# Patient Record
Sex: MALE | Race: WHITE | NOT HISPANIC OR LATINO | Employment: OTHER | URBAN - METROPOLITAN AREA
[De-identification: names, ages, dates, MRNs, and addresses within clinical notes are randomized per-mention and may not be internally consistent; named-entity substitution may affect disease eponyms.]

---

## 2017-01-20 ENCOUNTER — HOSPITAL ENCOUNTER (OUTPATIENT)
Facility: MEDICAL CENTER | Age: 72
End: 2017-01-21
Attending: EMERGENCY MEDICINE | Admitting: INTERNAL MEDICINE
Payer: COMMERCIAL

## 2017-01-20 ENCOUNTER — APPOINTMENT (OUTPATIENT)
Dept: RADIOLOGY | Facility: MEDICAL CENTER | Age: 72
End: 2017-01-20
Attending: EMERGENCY MEDICINE
Payer: COMMERCIAL

## 2017-01-20 ENCOUNTER — RESOLUTE PROFESSIONAL BILLING HOSPITAL PROF FEE (OUTPATIENT)
Dept: HOSPITALIST | Facility: MEDICAL CENTER | Age: 72
End: 2017-01-20
Payer: COMMERCIAL

## 2017-01-20 ENCOUNTER — APPOINTMENT (OUTPATIENT)
Dept: RADIOLOGY | Facility: MEDICAL CENTER | Age: 72
End: 2017-01-20
Attending: INTERNAL MEDICINE
Payer: COMMERCIAL

## 2017-01-20 DIAGNOSIS — G45.8 OTHER SPECIFIED TRANSIENT CEREBRAL ISCHEMIAS: ICD-10-CM

## 2017-01-20 DIAGNOSIS — R41.0 TRANSIENT CONFUSION: ICD-10-CM

## 2017-01-20 PROBLEM — G45.9 TIA (TRANSIENT ISCHEMIC ATTACK): Status: ACTIVE | Noted: 2017-01-20

## 2017-01-20 LAB
ALBUMIN SERPL BCP-MCNC: 4.1 G/DL (ref 3.2–4.9)
ALBUMIN/GLOB SERPL: 1.6 G/DL
ALP SERPL-CCNC: 53 U/L (ref 30–99)
ALT SERPL-CCNC: 11 U/L (ref 2–50)
ANION GAP SERPL CALC-SCNC: 5 MMOL/L (ref 0–11.9)
APPEARANCE UR: CLEAR
APTT PPP: 33 SEC (ref 24.7–36)
AST SERPL-CCNC: 14 U/L (ref 12–45)
BASOPHILS # BLD AUTO: 0.4 % (ref 0–1.8)
BASOPHILS # BLD: 0.02 K/UL (ref 0–0.12)
BILIRUB SERPL-MCNC: 0.5 MG/DL (ref 0.1–1.5)
BILIRUB UR QL STRIP.AUTO: NEGATIVE
BUN SERPL-MCNC: 19 MG/DL (ref 8–22)
CALCIUM SERPL-MCNC: 8.9 MG/DL (ref 8.5–10.5)
CHLORIDE SERPL-SCNC: 108 MMOL/L (ref 96–112)
CO2 SERPL-SCNC: 27 MMOL/L (ref 20–33)
COLOR UR: YELLOW
CREAT SERPL-MCNC: 0.85 MG/DL (ref 0.5–1.4)
CULTURE IF INDICATED INDCX: NO UA CULTURE
EOSINOPHIL # BLD AUTO: 0.13 K/UL (ref 0–0.51)
EOSINOPHIL NFR BLD: 2.6 % (ref 0–6.9)
ERYTHROCYTE [DISTWIDTH] IN BLOOD BY AUTOMATED COUNT: 45.1 FL (ref 35.9–50)
EST. AVERAGE GLUCOSE BLD GHB EST-MCNC: 117 MG/DL
GFR SERPL CREATININE-BSD FRML MDRD: >60 ML/MIN/1.73 M 2
GLOBULIN SER CALC-MCNC: 2.5 G/DL (ref 1.9–3.5)
GLUCOSE SERPL-MCNC: 117 MG/DL (ref 65–99)
GLUCOSE UR STRIP.AUTO-MCNC: NEGATIVE MG/DL
HBA1C MFR BLD: 5.7 % (ref 0–5.6)
HCT VFR BLD AUTO: 38 % (ref 42–52)
HGB BLD-MCNC: 13.3 G/DL (ref 14–18)
IMM GRANULOCYTES # BLD AUTO: 0 K/UL (ref 0–0.11)
IMM GRANULOCYTES NFR BLD AUTO: 0 % (ref 0–0.9)
INR PPP: 2.1 (ref 0.87–1.13)
KETONES UR STRIP.AUTO-MCNC: NEGATIVE MG/DL
LEUKOCYTE ESTERASE UR QL STRIP.AUTO: NEGATIVE
LYMPHOCYTES # BLD AUTO: 1.63 K/UL (ref 1–4.8)
LYMPHOCYTES NFR BLD: 32.5 % (ref 22–41)
MCH RBC QN AUTO: 33.3 PG (ref 27–33)
MCHC RBC AUTO-ENTMCNC: 35 G/DL (ref 33.7–35.3)
MCV RBC AUTO: 95 FL (ref 81.4–97.8)
MICRO URNS: NORMAL
MONOCYTES # BLD AUTO: 0.49 K/UL (ref 0–0.85)
MONOCYTES NFR BLD AUTO: 9.8 % (ref 0–13.4)
NEUTROPHILS # BLD AUTO: 2.75 K/UL (ref 1.82–7.42)
NEUTROPHILS NFR BLD: 54.7 % (ref 44–72)
NITRITE UR QL STRIP.AUTO: NEGATIVE
NRBC # BLD AUTO: 0 K/UL
NRBC BLD AUTO-RTO: 0 /100 WBC
PH UR STRIP.AUTO: 5.5 [PH]
PLATELET # BLD AUTO: 180 K/UL (ref 164–446)
PMV BLD AUTO: 9.7 FL (ref 9–12.9)
POTASSIUM SERPL-SCNC: 3.7 MMOL/L (ref 3.6–5.5)
PROT SERPL-MCNC: 6.6 G/DL (ref 6–8.2)
PROT UR QL STRIP: NEGATIVE MG/DL
PROTHROMBIN TIME: 24.2 SEC (ref 12–14.6)
RBC # BLD AUTO: 4 M/UL (ref 4.7–6.1)
RBC UR QL AUTO: NEGATIVE
SODIUM SERPL-SCNC: 140 MMOL/L (ref 135–145)
SP GR UR STRIP.AUTO: 1.02
TROPONIN I SERPL-MCNC: <0.01 NG/ML (ref 0–0.04)
WBC # BLD AUTO: 5 K/UL (ref 4.8–10.8)

## 2017-01-20 PROCEDURE — 71010 DX-CHEST-PORTABLE (1 VIEW): CPT

## 2017-01-20 PROCEDURE — 700102 HCHG RX REV CODE 250 W/ 637 OVERRIDE(OP): Performed by: INTERNAL MEDICINE

## 2017-01-20 PROCEDURE — G0378 HOSPITAL OBSERVATION PER HR: HCPCS

## 2017-01-20 PROCEDURE — 85025 COMPLETE CBC W/AUTO DIFF WBC: CPT

## 2017-01-20 PROCEDURE — 85610 PROTHROMBIN TIME: CPT

## 2017-01-20 PROCEDURE — 70450 CT HEAD/BRAIN W/O DYE: CPT

## 2017-01-20 PROCEDURE — 700105 HCHG RX REV CODE 258: Performed by: EMERGENCY MEDICINE

## 2017-01-20 PROCEDURE — 83036 HEMOGLOBIN GLYCOSYLATED A1C: CPT

## 2017-01-20 PROCEDURE — 99220 PR INITIAL OBSERVATION CARE,LEVL III: CPT | Performed by: INTERNAL MEDICINE

## 2017-01-20 PROCEDURE — 93005 ELECTROCARDIOGRAM TRACING: CPT | Performed by: EMERGENCY MEDICINE

## 2017-01-20 PROCEDURE — 99285 EMERGENCY DEPT VISIT HI MDM: CPT

## 2017-01-20 PROCEDURE — 85730 THROMBOPLASTIN TIME PARTIAL: CPT

## 2017-01-20 PROCEDURE — 80053 COMPREHEN METABOLIC PANEL: CPT

## 2017-01-20 PROCEDURE — 70551 MRI BRAIN STEM W/O DYE: CPT

## 2017-01-20 PROCEDURE — 81003 URINALYSIS AUTO W/O SCOPE: CPT

## 2017-01-20 PROCEDURE — 96360 HYDRATION IV INFUSION INIT: CPT

## 2017-01-20 PROCEDURE — A9270 NON-COVERED ITEM OR SERVICE: HCPCS | Performed by: INTERNAL MEDICINE

## 2017-01-20 PROCEDURE — 84484 ASSAY OF TROPONIN QUANT: CPT

## 2017-01-20 RX ORDER — ENEMA 19; 7 G/133ML; G/133ML
1 ENEMA RECTAL
Status: DISCONTINUED | OUTPATIENT
Start: 2017-01-20 | End: 2017-01-21 | Stop reason: HOSPADM

## 2017-01-20 RX ORDER — SODIUM CHLORIDE 9 MG/ML
1000 INJECTION, SOLUTION INTRAVENOUS ONCE
Status: COMPLETED | OUTPATIENT
Start: 2017-01-20 | End: 2017-01-20

## 2017-01-20 RX ORDER — AMOXICILLIN 250 MG
1 CAPSULE ORAL NIGHTLY
Status: DISCONTINUED | OUTPATIENT
Start: 2017-01-21 | End: 2017-01-21 | Stop reason: HOSPADM

## 2017-01-20 RX ORDER — BISACODYL 10 MG
10 SUPPOSITORY, RECTAL RECTAL
Status: DISCONTINUED | OUTPATIENT
Start: 2017-01-20 | End: 2017-01-21 | Stop reason: HOSPADM

## 2017-01-20 RX ORDER — CHLORAL HYDRATE 500 MG
1000 CAPSULE ORAL EVERY EVENING
COMMUNITY

## 2017-01-20 RX ORDER — LOVASTATIN 20 MG/1
80 TABLET ORAL NIGHTLY
Status: DISCONTINUED | OUTPATIENT
Start: 2017-01-20 | End: 2017-01-21 | Stop reason: HOSPADM

## 2017-01-20 RX ORDER — WARFARIN SODIUM 5 MG/1
5 TABLET ORAL
Status: COMPLETED | OUTPATIENT
Start: 2017-01-20 | End: 2017-01-21

## 2017-01-20 RX ORDER — AMOXICILLIN 250 MG
1 CAPSULE ORAL
Status: DISCONTINUED | OUTPATIENT
Start: 2017-01-20 | End: 2017-01-21 | Stop reason: HOSPADM

## 2017-01-20 RX ORDER — WARFARIN SODIUM 2.5 MG/1
7.5-1 TABLET ORAL DAILY
COMMUNITY

## 2017-01-20 RX ORDER — LISINOPRIL 5 MG/1
5 TABLET ORAL EVERY EVENING
COMMUNITY

## 2017-01-20 RX ORDER — SODIUM CHLORIDE 9 MG/ML
INJECTION, SOLUTION INTRAVENOUS CONTINUOUS
Status: DISCONTINUED | OUTPATIENT
Start: 2017-01-20 | End: 2017-01-21

## 2017-01-20 RX ORDER — ACETAMINOPHEN 500 MG
500-1000 TABLET ORAL EVERY 6 HOURS PRN
COMMUNITY

## 2017-01-20 RX ORDER — LACTULOSE 20 G/30ML
30 SOLUTION ORAL
Status: DISCONTINUED | OUTPATIENT
Start: 2017-01-20 | End: 2017-01-21 | Stop reason: HOSPADM

## 2017-01-20 RX ORDER — LOVASTATIN 40 MG/1
80 TABLET ORAL NIGHTLY
COMMUNITY

## 2017-01-20 RX ORDER — METFORMIN HYDROCHLORIDE 500 MG/1
500 TABLET, EXTENDED RELEASE ORAL 4 TIMES DAILY
COMMUNITY
End: 2017-01-20

## 2017-01-20 RX ORDER — LOVASTATIN 20 MG/1
20 TABLET ORAL NIGHTLY
COMMUNITY
End: 2017-01-20

## 2017-01-20 RX ORDER — DOCUSATE SODIUM 100 MG/1
100 CAPSULE, LIQUID FILLED ORAL EVERY MORNING
Status: DISCONTINUED | OUTPATIENT
Start: 2017-01-21 | End: 2017-01-21 | Stop reason: HOSPADM

## 2017-01-20 RX ADMIN — LOVASTATIN 80 MG: 20 TABLET ORAL at 23:01

## 2017-01-20 RX ADMIN — SODIUM CHLORIDE 1000 ML: 9 INJECTION, SOLUTION INTRAVENOUS at 19:04

## 2017-01-20 ASSESSMENT — LIFESTYLE VARIABLES
TOTAL SCORE: 2
EVER HAD A DRINK FIRST THING IN THE MORNING TO STEADY YOUR NERVES TO GET RID OF A HANGOVER: NO
ALCOHOL_USE: YES
CONSUMPTION TOTAL: POSITIVE
HAVE PEOPLE ANNOYED YOU BY CRITICIZING YOUR DRINKING: NO
EVER_SMOKED: YES
DO YOU DRINK ALCOHOL: NO
HAVE YOU EVER FELT YOU SHOULD CUT DOWN ON YOUR DRINKING: YES
ON A TYPICAL DAY WHEN YOU DRINK ALCOHOL HOW MANY DRINKS DO YOU HAVE: 4
TOTAL SCORE: 2
HOW MANY TIMES IN THE PAST YEAR HAVE YOU HAD 5 OR MORE DRINKS IN A DAY: 50
AVERAGE NUMBER OF DAYS PER WEEK YOU HAVE A DRINK CONTAINING ALCOHOL: 6
EVER FELT BAD OR GUILTY ABOUT YOUR DRINKING: YES
TOTAL SCORE: 2

## 2017-01-20 ASSESSMENT — PAIN SCALES - GENERAL: PAINLEVEL_OUTOF10: 0

## 2017-01-20 NOTE — IP AVS SNAPSHOT
" After Visit Summary                                                                                                                  Name:Paolo Sim  Medical Record Number:[id number]  CSN: [id number]    YOB: 1945   Age: 71 y.o.  Sex: male  HT:1.854 m (6' 1\") WT: 116.2 kg (256 lb 2.8 oz)          Admit Date: 1/20/2017     Discharge Date:   Today's Date: 1/21/2017  Attending Doctor:  Nayeli Rapp M.D.                  Allergies:  Review of patient's allergies indicates no known allergies.            Discharge Instructions       Discharge Instructions    Discharged to home by taxi with escort. Discharged via wheelchair, hospital escort: Yes.  Special equipment needed: Not Applicable    Be sure to schedule a follow-up appointment with your primary care doctor or any specialists as instructed.     Discharge Plan:   Diet Plan: Discussed  Activity Level: Discussed  Confirmed Follow up Appointment: Patient to Call and Schedule Appointment  Confirmed Symptoms Management: Discussed  Influenza Vaccine Indication: Patient Refuses (already up to date)    I understand that a diet low in cholesterol, fat, and sodium is recommended for good health. Unless I have been given specific instructions below for another diet, I accept this instruction as my diet prescription.   Other diet: Diabetic    Special Instructions:     · Is patient discharged on Warfarin / Coumadin?   No     · Is patient Post Blood Transfusion?  No    Depression / Suicide Risk    As you are discharged from this [facility] facility, it is important to learn how to keep safe from harming yourself.    Recognize the warning signs:  · Abrupt changes in personality, positive or negative- including increase in energy   · Giving away possessions  · Change in eating patterns- significant weight changes-  positive or negative  · Change in sleeping patterns- unable to sleep or sleeping all the time   · Unwillingness or inability to " communicate  · Depression  · Unusual sadness, discouragement and loneliness  · Talk of wanting to die  · Neglect of personal appearance   · Rebelliousness- reckless behavior  · Withdrawal from people/activities they love  · Confusion- inability to concentrate     If you or a loved one observes any of these behaviors or has concerns about self-harm, here's what you can do:  · Talk about it- your feelings and reasons for harming yourself  · Remove any means that you might use to hurt yourself (examples: pills, rope, extension cords, firearm)  · Get professional help from the community (Mental Health, Substance Abuse, psychological counseling)  · Do not be alone:Call your Safe Contact- someone whom you trust who will be there for you.  · Call your local CRISIS HOTLINE 198-9357 or 455-478-0652  · Call your local Children's Mobile Crisis Response Team [place] (606) 389-0254 or www.WhoWantsMe  · Call the toll free National Suicide Prevention Hotlines   · National Suicide Prevention Lifeline 864-273-QSZQ (0961)  · [employer] Hope Line Network 800-SUICIDE (841-2270)        Follow-up Information     1. Follow up with Dr Zenon Kikrland ([place]). Schedule an appointment as soon as possible for a visit in 2 weeks.    Why:  Hospital follow-up appointment with PCP    Contact information    [address] [place] [address]. [phone]        2. Schedule an appointment as soon as possible for a visit with Anticoagulation clinic.    Why:  As scheduled for INR check         Discharge Medication Instructions:    Below are the medications your physician expects you to take upon discharge:    Review all your home medications and newly ordered medications with your doctor and/or pharmacist. Follow medication instructions as directed by your doctor and/or pharmacist.    Please keep your medication list with you and share with your physician.               Medication List      CONTINUE taking these medications         Instructions    acetaminophen 500 MG Tabs   Commonly known as:  TYLENOL    Take 500-1,000 mg by mouth every 6 hours as needed for Moderate Pain.   Dose:  500-1000 mg       CHOLECALCIFEROL PO    Take 1 Cap by mouth every evening.   Dose:  1 Cap       CYANOCOBALAMIN PO    Take 1 Tab by mouth every evening.   Dose:  1 Tab       fish oil 1000 MG Caps capsule    Take 1,000 mg by mouth every evening.   Dose:  1000 mg       lisinopril 5 MG Tabs   Commonly known as:  PRINIVIL    Take 5 mg by mouth every evening.   Dose:  5 mg       lovastatin 40 MG tablet   Last time this was given:  80 mg on 1/20/2017 11:01 PM   Commonly known as:  MEVACOR    Take 80 mg by mouth every evening.   Dose:  80 mg       metformin 500 MG Tabs   Commonly known as:  GLUCOPHAGE    Take 2,000 mg by mouth every evening.   Dose:  2000 mg       warfarin 2.5 MG Tabs   Last time this was given:  5 mg on 1/21/2017 12:14 AM   Commonly known as:  COUMADIN    Take 7.5-10 mg by mouth every day. 10 mg on Monday and Friday. 7.5 mg on all other days of week.  On 01/20, patient took 2 tabs (5 mg) at 1800   Dose:  7.5-10 mg               Instructions           Diet / Nutrition:    Follow any diet instructions given to you by your doctor or the dietician, including how much salt (sodium) you are allowed each day.    If you are overweight, talk to your doctor about a weight reduction plan.    Activity:    Remain physically active following your doctor's instructions about exercise and activity.    Rest often.     Any time you become even a little tired or short of breath, SIT DOWN and rest.    Worsening Symptoms:    Report any of the following signs and symptoms to the doctor's office immediately:    *Pain of jaw, arm, or neck  *Chest pain not relieved by medication                               *Dizziness or loss of consciousness  *Difficulty breathing even when at rest   *More tired than usual                                       *Bleeding drainage or swelling of  surgical site  *Swelling of feet, ankles, legs or stomach                 *Fever (>100ºF)  *Pink or blood tinged sputum  *Weight gain (3lbs/day or 5lbs /week)           *Shock from internal defibrillator (if applicable)  *Palpitations or irregular heartbeats                *Cool and/or numb extremities    Stroke Awareness    Common Risk Factors for Stroke include:    Age  Atrial Fibrillation  Carotid Artery Stenosis  Diabetes Mellitus  Excessive alcohol consumption  High blood pressure  Overweight   Physical inactivity  Smoking    Warning signs and symptoms of a stroke include:    *Sudden numbness or weakness of the face, arm or leg (especially on one side of the body).  *Sudden confusion, trouble speaking or understanding.  *Sudden trouble seeing in one or both eyes.  *Sudden trouble walking, dizziness, loss of balance or coordination.Sudden severe headache with no known cause.    It is very important to get treatment quickly when a stroke occurs. If you experience any of the above warning signs, call 911 immediately.                   Disclaimer         Quit Smoking / Tobacco Use:    I understand the use of any tobacco products increases my chance of suffering from future heart disease or stroke and could cause other illnesses which may shorten my life. Quitting the use of tobacco products is the single most important thing I can do to improve my health. For further information on smoking / tobacco cessation call a Toll Free Quit Line at 1-837.350.2007 (*National Cancer [place]) or 1-383.543.9017 (American Lung Association) or you can access the web based program at www.lungusa.org.    [place] Tobacco Users Help Line:  (842) 560-8021       Toll Free: 1-374.834.8880  Quit Tobacco Program [facility] (574)526-8064    Crisis Hotline:    [place] Crisis Hotline:  8-166-ENFWUTW or 1-583.411.8658    [place] Crisis Hotline:    1-828.859.3373 or 288-479-3902    Discharge Survey:   Thank you for  choosing [facility]. We hope we did everything we could to make your hospital stay a pleasant one. You may be receiving a phone survey and we would appreciate your time and participation in answering the questions. Your input is very valuable to us in our efforts to improve our service to our patients and their families.        My signature on this form indicates that:    1. I have reviewed and understand the above information.  2. My questions regarding this information have been answered to my satisfaction.  3. I have formulated a plan with my discharge nurse to obtain my prescribed medications for home.                  Disclaimer         __________________________________                     __________       ________                       Patient Signature                                                 Date                    Time

## 2017-01-20 NOTE — IP AVS SNAPSHOT
1/21/2017          Paolo Sim  [address] [address]    Dear Paolo:    [facility] wants to ensure your discharge home is safe and you or your loved ones have had all your questions answered regarding your care after you leave the hospital.    You may receive a telephone call within two days of your discharge.  This call is to make certain you understand your discharge instructions as well as ensure we provided you with the best care possible during your stay with us.     The call will only last approximately 3-5 minutes and will be done by a nurse.    Once again, we want to ensure your discharge home is safe and that you have a clear understanding of any next steps in your care.  If you have any questions or concerns, please do not hesitate to contact us, we are here for you.  Thank you for choosing [facility] for your healthcare needs.    Sincerely,    Cayetano Loving    [facility]

## 2017-01-20 NOTE — IP AVS SNAPSHOT
Employee Benefit Plans Access Code: [email or web address]  Expires: 2/20/2017  4:49 PM    Employee Benefit Plans  A secure, online tool to manage your health information     [employer]’s Employee Benefit Plans® is a secure, online tool that connects you to your personalized health information from the privacy of your home -- day or night - making it very easy for you to manage your healthcare. Once the activation process is completed, you can even access your medical information using the Employee Benefit Plans beni, which is available for free in the [employer] Beni store or Google Play store.     Employee Benefit Plans provides the following levels of access (as shown below):   My Chart Features   [facility] Primary Care Doctor [facility]  Specialists [facility]  Urgent  Care Non-[facility]  Primary Care  Doctor   Email your healthcare team securely and privately 24/7 X X X X   Manage appointments: schedule your next appointment; view details of past/upcoming appointments X      Request prescription refills. X      View recent personal medical records, including lab and immunizations X X X X   View health record, including health history, allergies, medications X X X X   Read reports about your outpatient visits, procedures, consult and ER notes X X X X   See your discharge summary, which is a recap of your hospital and/or ER visit that includes your diagnosis, lab results, and care plan. X X       How to register for Employee Benefit Plans:  1. Go to  [email or web address].  2. Click on the Sign Up Now box, which takes you to the New Member Sign Up page. You will need to provide the following information:  a. Enter your Employee Benefit Plans Access Code exactly as it appears at the top of this page. (You will not need to use this code after you’ve completed the sign-up process. If you do not sign up before the expiration date, you must request a new code.)   b. Enter your date of birth.   c. Enter your home email address.   d. Click Submit, and follow the next screen’s instructions.  3. Create a Employee Benefit Plans ID. This will be your Employee Benefit Plans  login ID and cannot be changed, so think of one that is secure and easy to remember.  4. Create a [employer] password. You can change your password at any time.  5. Enter your Password Reset Question and Answer. This can be used at a later time if you forget your password.   6. Enter your e-mail address. This allows you to receive e-mail notifications when new information is available in [employer].  7. Click Sign Up. You can now view your health information.    For assistance activating your [employer] account, call [phone]

## 2017-01-20 NOTE — IP AVS SNAPSHOT
" <p align=\"LEFT\"><IMG SRC=\"[email or web address]\" alt=\"Image\" WIDTH=\"50%\" HEIGHT=\"200\" BORDER=\"\"></p>                   Name:Paolo Sim  Medical Record Number:[id number]  CSN: [id number]    YOB: 1945   Age: 71 y.o.  Sex: male  HT:1.854 m (6' 1\") WT: 116.2 kg (256 lb 2.8 oz)          Admit Date: 1/20/2017     Discharge Date:   Today's Date: 1/21/2017  Attending Doctor:  Nayeli Rapp M.D.                  Allergies:  Review of patient's allergies indicates no known allergies.          Follow-up Information     1. Follow up with Dr Zenon Kirkland ([place]). Schedule an appointment as soon as possible for a visit in 2 weeks.    Why:  Hospital follow-up appointment with PCP    Contact information    [address] [place] [phone]        2. Schedule an appointment as soon as possible for a visit with Anticoagulation clinic.    Why:  As scheduled for INR check         Medication List      Take these Medications        Instructions    acetaminophen 500 MG Tabs   Commonly known as:  TYLENOL    Take 500-1,000 mg by mouth every 6 hours as needed for Moderate Pain.   Dose:  500-1000 mg       CHOLECALCIFEROL PO    Take 1 Cap by mouth every evening.   Dose:  1 Cap       CYANOCOBALAMIN PO    Take 1 Tab by mouth every evening.   Dose:  1 Tab       fish oil 1000 MG Caps capsule    Take 1,000 mg by mouth every evening.   Dose:  1000 mg       lisinopril 5 MG Tabs   Commonly known as:  PRINIVIL    Take 5 mg by mouth every evening.   Dose:  5 mg       lovastatin 40 MG tablet   Commonly known as:  MEVACOR    Take 80 mg by mouth every evening.   Dose:  80 mg       metformin 500 MG Tabs   Commonly known as:  GLUCOPHAGE    Take 2,000 mg by mouth every evening.   Dose:  2000 mg       warfarin 2.5 MG Tabs   Commonly known as:  COUMADIN    Take 7.5-10 mg by mouth every day. 10 mg on Monday and Friday. 7.5 mg on all other days of week.  On 01/20, patient took 2 tabs (5 mg) at 1800   Dose:  7.5-10 " mg

## 2017-01-21 VITALS
WEIGHT: 256.17 LBS | DIASTOLIC BLOOD PRESSURE: 70 MMHG | HEIGHT: 73 IN | TEMPERATURE: 97.9 F | OXYGEN SATURATION: 95 % | SYSTOLIC BLOOD PRESSURE: 113 MMHG | HEART RATE: 56 BPM | RESPIRATION RATE: 18 BRPM | BODY MASS INDEX: 33.95 KG/M2

## 2017-01-21 PROBLEM — R41.0 TRANSIENT CONFUSION: Status: RESOLVED | Noted: 2017-01-20 | Resolved: 2017-01-21

## 2017-01-21 PROBLEM — I82.409 DVT (DEEP VENOUS THROMBOSIS) (HCC): Chronic | Status: ACTIVE | Noted: 2017-01-21

## 2017-01-21 PROBLEM — E11.9 DIABETES MELLITUS, TYPE II (HCC): Chronic | Status: ACTIVE | Noted: 2017-01-21

## 2017-01-21 PROBLEM — G45.9 TIA (TRANSIENT ISCHEMIC ATTACK): Status: RESOLVED | Noted: 2017-01-20 | Resolved: 2017-01-21

## 2017-01-21 LAB
ALBUMIN SERPL BCP-MCNC: 3.6 G/DL (ref 3.2–4.9)
ALBUMIN/GLOB SERPL: 1.7 G/DL
ALP SERPL-CCNC: 44 U/L (ref 30–99)
ALT SERPL-CCNC: 10 U/L (ref 2–50)
ANION GAP SERPL CALC-SCNC: 6 MMOL/L (ref 0–11.9)
AST SERPL-CCNC: 13 U/L (ref 12–45)
BILIRUB SERPL-MCNC: 0.5 MG/DL (ref 0.1–1.5)
BUN SERPL-MCNC: 17 MG/DL (ref 8–22)
CALCIUM SERPL-MCNC: 8.5 MG/DL (ref 8.5–10.5)
CHLORIDE SERPL-SCNC: 110 MMOL/L (ref 96–112)
CHOLEST SERPL-MCNC: 109 MG/DL (ref 100–199)
CO2 SERPL-SCNC: 25 MMOL/L (ref 20–33)
CREAT SERPL-MCNC: 0.8 MG/DL (ref 0.5–1.4)
ERYTHROCYTE [DISTWIDTH] IN BLOOD BY AUTOMATED COUNT: 45.3 FL (ref 35.9–50)
GFR SERPL CREATININE-BSD FRML MDRD: >60 ML/MIN/1.73 M 2
GLOBULIN SER CALC-MCNC: 2.1 G/DL (ref 1.9–3.5)
GLUCOSE BLD-MCNC: 115 MG/DL (ref 65–99)
GLUCOSE BLD-MCNC: 156 MG/DL (ref 65–99)
GLUCOSE SERPL-MCNC: 151 MG/DL (ref 65–99)
HCT VFR BLD AUTO: 35.2 % (ref 42–52)
HDLC SERPL-MCNC: 32 MG/DL
HGB BLD-MCNC: 12.3 G/DL (ref 14–18)
INR PPP: 2.2 (ref 0.87–1.13)
LDLC SERPL CALC-MCNC: 54 MG/DL
LV EJECT FRACT  99904: 60
MCH RBC QN AUTO: 33.1 PG (ref 27–33)
MCHC RBC AUTO-ENTMCNC: 34.9 G/DL (ref 33.7–35.3)
MCV RBC AUTO: 94.6 FL (ref 81.4–97.8)
PLATELET # BLD AUTO: 153 K/UL (ref 164–446)
PMV BLD AUTO: 9.8 FL (ref 9–12.9)
POTASSIUM SERPL-SCNC: 3.7 MMOL/L (ref 3.6–5.5)
PROT SERPL-MCNC: 5.7 G/DL (ref 6–8.2)
PROTHROMBIN TIME: 25.1 SEC (ref 12–14.6)
RBC # BLD AUTO: 3.72 M/UL (ref 4.7–6.1)
SODIUM SERPL-SCNC: 141 MMOL/L (ref 135–145)
TRIGL SERPL-MCNC: 117 MG/DL (ref 0–149)
WBC # BLD AUTO: 4.8 K/UL (ref 4.8–10.8)

## 2017-01-21 PROCEDURE — 97165 OT EVAL LOW COMPLEX 30 MIN: CPT

## 2017-01-21 PROCEDURE — 93306 TTE W/DOPPLER COMPLETE: CPT

## 2017-01-21 PROCEDURE — 80053 COMPREHEN METABOLIC PANEL: CPT

## 2017-01-21 PROCEDURE — 82962 GLUCOSE BLOOD TEST: CPT | Mod: 91

## 2017-01-21 PROCEDURE — 93306 TTE W/DOPPLER COMPLETE: CPT | Mod: 26 | Performed by: INTERNAL MEDICINE

## 2017-01-21 PROCEDURE — 93880 EXTRACRANIAL BILAT STUDY: CPT

## 2017-01-21 PROCEDURE — G8978 MOBILITY CURRENT STATUS: HCPCS | Mod: CI

## 2017-01-21 PROCEDURE — 700105 HCHG RX REV CODE 258: Performed by: INTERNAL MEDICINE

## 2017-01-21 PROCEDURE — G8979 MOBILITY GOAL STATUS: HCPCS | Mod: CI

## 2017-01-21 PROCEDURE — 93880 EXTRACRANIAL BILAT STUDY: CPT | Mod: 26 | Performed by: SURGERY

## 2017-01-21 PROCEDURE — 700102 HCHG RX REV CODE 250 W/ 637 OVERRIDE(OP): Performed by: INTERNAL MEDICINE

## 2017-01-21 PROCEDURE — 85027 COMPLETE CBC AUTOMATED: CPT

## 2017-01-21 PROCEDURE — A9270 NON-COVERED ITEM OR SERVICE: HCPCS | Performed by: INTERNAL MEDICINE

## 2017-01-21 PROCEDURE — G8980 MOBILITY D/C STATUS: HCPCS | Mod: CI

## 2017-01-21 PROCEDURE — 80061 LIPID PANEL: CPT

## 2017-01-21 PROCEDURE — 99217 PR OBSERVATION CARE DISCHARGE: CPT | Performed by: INTERNAL MEDICINE

## 2017-01-21 PROCEDURE — 36415 COLL VENOUS BLD VENIPUNCTURE: CPT

## 2017-01-21 PROCEDURE — 700112 HCHG RX REV CODE 229: Performed by: INTERNAL MEDICINE

## 2017-01-21 PROCEDURE — 97163 PT EVAL HIGH COMPLEX 45 MIN: CPT

## 2017-01-21 PROCEDURE — G8988 SELF CARE GOAL STATUS: HCPCS | Mod: CI

## 2017-01-21 PROCEDURE — 85610 PROTHROMBIN TIME: CPT

## 2017-01-21 PROCEDURE — G8989 SELF CARE D/C STATUS: HCPCS | Mod: CI

## 2017-01-21 PROCEDURE — G0378 HOSPITAL OBSERVATION PER HR: HCPCS

## 2017-01-21 PROCEDURE — G8987 SELF CARE CURRENT STATUS: HCPCS | Mod: CI

## 2017-01-21 PROCEDURE — A9270 NON-COVERED ITEM OR SERVICE: HCPCS

## 2017-01-21 PROCEDURE — 700102 HCHG RX REV CODE 250 W/ 637 OVERRIDE(OP)

## 2017-01-21 RX ORDER — WARFARIN SODIUM 10 MG/1
10 TABLET ORAL
Status: DISCONTINUED | OUTPATIENT
Start: 2017-01-23 | End: 2017-01-21 | Stop reason: HOSPADM

## 2017-01-21 RX ORDER — WARFARIN SODIUM 7.5 MG/1
7.5 TABLET ORAL
Status: DISCONTINUED | OUTPATIENT
Start: 2017-01-21 | End: 2017-01-21 | Stop reason: HOSPADM

## 2017-01-21 RX ADMIN — WARFARIN SODIUM 5 MG: 5 TABLET ORAL at 00:14

## 2017-01-21 RX ADMIN — DOCUSATE SODIUM 100 MG: 100 CAPSULE ORAL at 08:19

## 2017-01-21 RX ADMIN — SODIUM CHLORIDE: 9 INJECTION, SOLUTION INTRAVENOUS at 00:16

## 2017-01-21 RX ADMIN — INSULIN LISPRO 2 UNITS: 100 INJECTION, SOLUTION INTRAVENOUS; SUBCUTANEOUS at 11:06

## 2017-01-21 RX ADMIN — SODIUM CHLORIDE: 9 INJECTION, SOLUTION INTRAVENOUS at 08:18

## 2017-01-21 ASSESSMENT — ACTIVITIES OF DAILY LIVING (ADL): TOILETING: INDEPENDENT

## 2017-01-21 ASSESSMENT — PATIENT HEALTH QUESTIONNAIRE - PHQ9
2. FEELING DOWN, DEPRESSED, IRRITABLE, OR HOPELESS: NOT AT ALL
1. LITTLE INTEREST OR PLEASURE IN DOING THINGS: NOT AT ALL
SUM OF ALL RESPONSES TO PHQ9 QUESTIONS 1 AND 2: 0
SUM OF ALL RESPONSES TO PHQ QUESTIONS 1-9: 0

## 2017-01-21 ASSESSMENT — PAIN SCALES - GENERAL
PAINLEVEL_OUTOF10: 0

## 2017-01-21 ASSESSMENT — GAIT ASSESSMENTS
DEVIATION: OTHER (COMMENT)
GAIT LEVEL OF ASSIST: SUPERVISED
DISTANCE (FEET): 300

## 2017-01-21 NOTE — THERAPY
"Occupational Therapy Evaluation completed.   Functional Status:  Pt presented to skilled OT services with transient confusion and new onset of acute memory loss. MRI and CT scan negative for any acute changes and pt sympotms appears to have resolved and is near his baseline. However, questionable STM as pt noted to be repeating same story within session multiple times, may benefit from ST eval to identify any higher level deficits which may impair pt's participation with IADLs. Pt able to safely perform ADLs and functional mobility during session w/o AD. Pt does not present the need for acute skilled OT services at this time.  Plan of Care: Patient with no further skilled OT needs in the acute care setting at this time  Discharge Recommendations:  Equipment: No Equipment Needed. Post-acute therapy recommended after discharged home.    See \"Rehab Therapy-Acute\" Patient Summary Report for complete documentation.    "

## 2017-01-21 NOTE — H&P
CHIEF COMPLAINT:  Transient confusion.    HISTORY OF PRESENT ILLNESS:  Patient is a 71-year-old male with history of DVT   and diabetes, who actually was travelling here from [facility] and as per him   today approximately around 4 p.m. he had this transient episode of confusion   and acute onset of short memory loss where he could not remember all the   things that he has done during the day.  He denies any numbness or tingling in   lower extremities.  He said he was having some really light headaches for the   last 2-3 days.  He denies any fever or chills, no chest pain, no shortness of   breath, and no nausea or vomiting.    REVIEW OF SYSTEMS:  All negative except as per HPI.    PAST MEDICAL HISTORY:  History of DVT and diabetes.    PAST SURGICAL HISTORY:  History of bilateral knee replacement, history of   cataracts.    SOCIAL HISTORY:  No tobacco or drug use.  Drinks glass of wine at night.    ALLERGIES:  No known allergies.    FAMILY HISTORY:  Father with history of leukemia.    PHYSICAL EXAMINATION:  VITAL SIGNS:  Blood pressure 121/65, respirations 18, pulse 66, temperature   98.1.  GENERAL:  No acute distress, nontoxic appearance.  HEENT:  Normocephalic, atraumatic.  Pupils are equally round and reactive to   light.  NECK:  Supple.  No adenopathy.  CARDIOVASCULAR:  Regular rate and rhythm.  No murmur or gallops appreciated.  LUNGS:  Clear to auscultation bilaterally.  No rales, rhonchi or wheezing.  ABDOMEN:  Soft.  Nontender.  Positive bowel sounds.  EXTREMITIES:  No edema, cyanosis, or clubbing.  NEUROLOGICAL:  No focal deficit.  Alert and oriented x4.    LAB WORK:  WBC 5, hemoglobin 13.3, hematocrit 38, platelet 180.  Sodium 140,   potassium 3.7, chloride 108, bicarb 27, glucose 117, BUN 19, creatinine 0.85,   troponin 0.01.    IMAGIN.  CT head, no acute intracranial abnormalities identified.  2.  Chest x-ray, no acute cardiopulmonary process seen.    ASSESSMENT AND PLAN:  1.  Transient confusion,  which is actually getting better _____ no other   neurological deficits.  We will start doing a transient ischemic attack   workup.  CT head here was negative.  We will do an MRI of the brain,   ultrasound of carotid _____ and we will monitor on tele for any arrhythmias.  2.  History of deep vein thrombosis, patient is warfarin, we will continue   with.  3.  History of diabetes.  We will start patient on sliding scale insulin,   Accu-Cheks, and continue to monitor.  4.  Prophylactic deep vein thrombosis, patient is already on warfarin.  5.  Code status, patient is a full code.       ____________________________________     MD AUTUMN FRANK / BERNARD    DD:  01/20/2017 23:53:29  DT:  01/21/2017 02:07:06    D#:  [id number]  Job#:  [id number]

## 2017-01-21 NOTE — PROGRESS NOTES
Patient arrived from ED via gurney, ambulated to bed with steady gait, wife at bedside, patient is AOx4, on RA, has 5/5 extremity strength x 4, denies pain, states having BLLE numbness which is from his DM, passed RN bedside swallow evaluation, provided food per patient request, refused bed alarm, call light provided, stroke education book provided, reviewed plan of care, hourly rounding in practice.

## 2017-01-21 NOTE — ED NOTES
"Pt arrived via EMS, per EMS at approx. 1700 today pt spouse noticed he was acting confused. Pt was repeatedly asking questions he would know and at times during the day pt states he felt loopy. Pt states he started to feel \"loopy\" after getting out of shower at approx. 1630 today. Pt states \"I didn't feel bad but I just felt like I couldn't remember things.\" pt does states dull HA x 3 days. Pt spouse denies any changes in speech. Pt has clear speech, equal  bilaterally, no arm drift., leg drift.  +droop noticed to R side of face, pt does state some tingling to R cheek. Pt does take coumadin, last dose at 1800 today. Pt denies any recent falls/trauma. Pt a/o x4.   "

## 2017-01-21 NOTE — ED PROVIDER NOTES
"ED Provider Note    CHIEF COMPLAINT  Chief Complaint   Patient presents with   • Altered Mental Status   • Headache       HPI  Paolo Sim is a 71 y.o. male who presents for evaluation of confusion, onset was approximately 2 hours prior to arrival. Patient reports he had confusion and acute onset of memory deficits for which he was seemingly aware although his wife provides some information that he cannot remember. The patient denies any weakness or numbness, he reports a little bit of tingling on the right cheek but otherwise has no other focal complaints. No chest pain or shortness of breath or abdominal pain or back pain. History of hypertension and diabetes as well as DVT currently on Coumadin. Reports he is improving significantly and at this time is him was back to baseline. No history of stroke. He does report a mild headache over the past couple of days. He has no other complaints at this time.    REVIEW OF SYSTEMS  Negative for fever, rash, chest pain, dyspnea, abdominal pain, nausea, vomiting, diarrhea. All other systems are negative.     PAST MEDICAL HISTORY  Past Medical History   Diagnosis Date   • Diabetes (CMS-HCC)    • Hypertension    • DVT (deep venous thrombosis) (CMS-HCC)    • High cholesterol        FAMILY HISTORY  History reviewed. No pertinent family history.    SOCIAL HISTORY  Social History   Substance Use Topics   • Smoking status: Never Smoker    • Smokeless tobacco: None   • Alcohol Use: No       SURGICAL HISTORY  History reviewed. No pertinent past surgical history.    CURRENT MEDICATIONS  I personally reviewed the medication list in the charting documentation.     ALLERGIES  No Known Allergies    MEDICAL RECORD  I have reviewed patient's medical record and pertinent results are listed above.      PHYSICAL EXAM  VITAL SIGNS: /65 mmHg  Pulse 66  Temp(Src) 36.7 °C (98.1 °F)  Resp 18  Ht 1.854 m (6' 0.99\")  Wt 111.585 kg (246 lb)  BMI 32.46 kg/m2   Constitutional: Well appearing " patient in no acute distress.  Not toxic, nor ill in appearance.  HENT: Mucus membranes moist.    Eyes: No scleral icterus. Normal conjunctiva   Neck: Supple, comfortable, nonpainful range of motion.   Cardiovascular: Regular heart rate and rhythm.   Thorax & Lungs: Chest is nontender.  Lungs are clear to auscultation with good air movement bilaterally.  No wheeze, rhonchi, nor rales.   Abdomen: Soft, with no tenderness, rebound nor guarding.  No mass or pulsatile mass appreciated.  Skin: Warm, dry. No rash appreciated  Extremities/Musculoskeletal: No sign of trauma. No asymmetric calf tenderness, erythema or edema. Normal range of motion   Neurologic: AAOx4, Cranial nerves II-XII grossly intact, PERRLA, EOMI, speech is normal, normal and symmetric motor and sensory functions upper and lower extremities bilaterally  Psychiatric: Normal affect appropriate for the clinical situation.    DIAGNOSTIC STUDIES / PROCEDURES    EKG  12 Lead EKG interpreted by me to show:    Rate 98  Rhythm: Normal sinus rhythm  Axis: Normal  PA and QRS Intervals: Normal  T waves: No acute changes  ST segments: No acute changes  Ectopy: None.    My impression of this EKG: Does not indicate ischemia or arrythmia at this time.      LABS  Results for orders placed or performed during the hospital encounter of 01/20/17   CBC WITH DIFFERENTIAL   Result Value Ref Range    WBC 5.0 4.8 - 10.8 K/uL    RBC 4.00 (L) 4.70 - 6.10 M/uL    Hemoglobin 13.3 (L) 14.0 - 18.0 g/dL    Hematocrit 38.0 (L) 42.0 - 52.0 %    MCV 95.0 81.4 - 97.8 fL    MCH 33.3 (H) 27.0 - 33.0 pg    MCHC 35.0 33.7 - 35.3 g/dL    RDW 45.1 35.9 - 50.0 fL    Platelet Count 180 164 - 446 K/uL    MPV 9.7 9.0 - 12.9 fL    Neutrophils-Polys 54.70 44.00 - 72.00 %    Lymphocytes 32.50 22.00 - 41.00 %    Monocytes 9.80 0.00 - 13.40 %    Eosinophils 2.60 0.00 - 6.90 %    Basophils 0.40 0.00 - 1.80 %    Immature Granulocytes 0.00 0.00 - 0.90 %    Nucleated RBC 0.00 /100 WBC    Neutrophils  (Absolute) 2.75 1.82 - 7.42 K/uL    Lymphs (Absolute) 1.63 1.00 - 4.80 K/uL    Monos (Absolute) 0.49 0.00 - 0.85 K/uL    Eos (Absolute) 0.13 0.00 - 0.51 K/uL    Baso (Absolute) 0.02 0.00 - 0.12 K/uL    Immature Granulocytes (abs) 0.00 0.00 - 0.11 K/uL    NRBC (Absolute) 0.00 K/uL   COMP METABOLIC PANEL   Result Value Ref Range    Sodium 140 135 - 145 mmol/L    Potassium 3.7 3.6 - 5.5 mmol/L    Chloride 108 96 - 112 mmol/L    Co2 27 20 - 33 mmol/L    Anion Gap 5.0 0.0 - 11.9    Glucose 117 (H) 65 - 99 mg/dL    Bun 19 8 - 22 mg/dL    Creatinine 0.85 0.50 - 1.40 mg/dL    Calcium 8.9 8.5 - 10.5 mg/dL    AST(SGOT) 14 12 - 45 U/L    ALT(SGPT) 11 2 - 50 U/L    Alkaline Phosphatase 53 30 - 99 U/L    Total Bilirubin 0.5 0.1 - 1.5 mg/dL    Albumin 4.1 3.2 - 4.9 g/dL    Total Protein 6.6 6.0 - 8.2 g/dL    Globulin 2.5 1.9 - 3.5 g/dL    A-G Ratio 1.6 g/dL   TROPONIN   Result Value Ref Range    Troponin I <0.01 0.00 - 0.04 ng/mL   PROTHROMBIN TIME   Result Value Ref Range    PT 24.2 (H) 12.0 - 14.6 sec    INR 2.10 (H) 0.87 - 1.13   APTT   Result Value Ref Range    APTT 33.0 24.7 - 36.0 sec   ESTIMATED GFR   Result Value Ref Range    GFR If African American >60 >60 mL/min/1.73 m 2    GFR If Non African American >60 >60 mL/min/1.73 m 2   Hemoglobin A1C   Result Value Ref Range    Glycohemoglobin 5.7 (H) 0.0 - 5.6 %    Est Avg Glucose 117 mg/dL         RADIOLOGY  CT-HEAD W/O   Final Result         1. No acute intracranial abnormality. No evidence of acute hemorrhage or mass lesion.               DX-CHEST-PORTABLE (1 VIEW)   Final Result         1. No pulmonary infiltrates or consolidations are noted.      2. Cardiomegaly.      MR-BRAIN-W/O    (Results Pending)   Echocardiogram Comp W/O cont    (Results Pending)   Carotid Duplex ([facility] Only) - Do not order if CTA - Neck done in ER    (Results Pending)         COURSE & MEDICAL DECISION MAKING  I have reviewed any medical record information, laboratory studies and  radiographic results as noted above.  Differential diagnoses includes: TIA/CVA, hyperglycemia, electrolyte abnormalities, dehydration, anemia    Encounter Summary: This is a 71 y.o. male with altered mental status, acute onset of confusion and memory difficulties that his symptoms improved significantly. Upon my evaluation I do not appreciate any focal neurologic findings whatsoever, NIH stroke scale is 0. Will obtain blood work and a head CT and reevaluate ----- blood work, CT and chest x-ray are unremarkable. Patient has been admitted to the hospital for further evaluation of his transient confusion with possible TIA      DISPOSITION: Admitted in guarded condition      FINAL IMPRESSION  1. Transient confusion    2. Other specified transient cerebral ischemias           This dictation was created using voice recognition software. The accuracy of the dictation is limited to the abilities of the software. I expect there may be some errors of grammar and possibly content. The nursing notes were reviewed and certain aspects of this information were incorporated into this note.    Electronically signed by: Adolfo Sharp, 1/20/2017 7:00 PM

## 2017-01-21 NOTE — ED NOTES
Med rec complete per patient and SO at bedside with Rx bottles ([facility]).  Patient states he normally takes 10 mg warfarin on Friday and took 5 mg today.  Patient's Rx bottle for metformin states 500 mg QID, patient states he takes 2,000 mg at HS.  No antibiotics within last month.  Allergies reviewed.

## 2017-01-21 NOTE — CARE PLAN
Problem: Safety  Goal: Will remain free from injury  Outcome: PROGRESSING AS EXPECTED  Pt uses call light appropriately    Problem: Knowledge Deficit  Goal: Knowledge of disease process/condition, treatment plan, diagnostic tests, and medications will improve  Outcome: PROGRESSING AS EXPECTED  Instruction provided regarding stroke/tia

## 2017-01-21 NOTE — PROGRESS NOTES
Inpatient Anticoagulation Service Note    Date: 1/21/2017  Reason for Anticoagulation: Deep Vein Thrombosis  Hemoglobin Value: 12.3  Hematocrit Value: 35.2  Lab Platelet Value: 153  Target INR: 2.0 to 3.0  INR from last 7 days     Date/Time INR Value    01/21/17 0317 (!)2.2    01/20/17 1849 (!)2.1        Dose from last 7 days     Date/Time Dose (mg)    01/21/17 1100 7.5    01/20/17 2214 5        Average Dose (mg):  (home dose: 10 mg Monday/Friday, 7.5 mg AOD per med rec note)  Significant Interactions: Statin  Bridge Therapy: N/A    Reversal Agent Administered: Not Applicable  Comments: INR stable in therapeutic range. H&H stable, no change in DDI.   Plan:  Resume home dose: to receive 7.5 mg tonight.    Education Material Provided?: No (chronic warfarin pt)  Pharmacist suggested discharge dosing: likely home dose of 10 mg on Mondays and Fridays, 7.5 mg all other days     Cristofer Toribio, PharmD

## 2017-01-21 NOTE — PROGRESS NOTES
Inpatient Anticoagulation Service Note    Date: 1/20/2017  Reason for Anticoagulation: Deep Vein Thrombosis        Hemoglobin Value: 13.3  Hematocrit Value: 38  Lab Platelet Value: 180  Target INR: 2.0 to 3.0    INR from last 7 days     Date/Time INR Value    01/20/17 1849 (!)2.1        Dose from last 7 days     Date/Time Dose (mg)    01/20/17 2214 5        Average Dose (mg):  (home dose: 10 mg Monday/Friday, 7.5 mg AOD)  Significant Interactions: Statin  Bridge Therapy: No    Reversal Agent Administered: Not Applicable  Comments: New admission for possible TIA/transient confusion.  Patient chronically anticoagulated for history of DVT.  INR upon admission therapeutic.  Patient did take a dose this evening of warfarin, but it was only 5 mg instead of his usual 10.  Will give another 5 mg tonight and likely resume his home dose tomorrow.  No S/S bleeding noted. Interactionsn noted.  Warfarin protocol ordered, pharmacy will continue to follow.    Plan:  5 mg tonight (in addition to the 5 mg the patient took at home), INR tomorrow  Education Material Provided?: No (chronic warfarin patient )  Pharmacist suggested discharge dosing: likely home dose of 10 mg on Mondays and Fridays, 7.5 mg all other days     Lachelle Whitney, SHANELLD

## 2017-01-22 ENCOUNTER — PATIENT OUTREACH (OUTPATIENT)
Dept: HEALTH INFORMATION MANAGEMENT | Facility: OTHER | Age: 72
End: 2017-01-22

## 2017-01-22 NOTE — DISCHARGE SUMMARY
PRIMARY CARE PROVIDER: Established at [facility]    ADMITTING PHYSICIAN:  Kalyan Parker MD    DISCHARGING PHYSICIAN:  Nayeli Rapp MD    CHIEF COMPLAINT:  Transient confusion.    DISCHARGE DIAGNOSES:  1.  Transient amnesia, resolved.  2.  Chronic anticoagulation with Coumadin.    CHRONIC PAST MEDICAL HISTORY:  1.  Diabetes type 2.  2.  DVT.  3.  Hypertension.  4.  Hypercholesterolemia.    CODE STATUS:  Full code.    HOSPITAL SUMMARY:  Please refer to H and P dictated by Dr. Parker on 1/20/2016   for full details.    In brief, the patient is a 71-year-old male who was admitted on 1/20/2017 for   complaints of transient confusion.  [place] when today at approximately 4 p.m. he developed transient confusion   where his wife thought that he was not understanding what he was reading and   he was blankly staring.  The patient reportedly had poor oral intake and had   not eat in all day.  He is borderline diabetic.  He has not had this happened   before.  He denies any headache, neurologic deficit at that time, sensory   deficit or weakness of any extremity.  The wife did not report any dysarthria   or problems with speech or facial droop.  The patient was thus placed under   observation status for stroke workup.    The patient underwent workup as below.  His MRI study was negative.  He   underwent carotid ultrasound and echo, which were unremarkable.  The patient   was observed overnight without events.  He was observed on telemetry without   events.  We have discussed with the patient that there is suspicion that the   patient's event was really due to hypoglycemia as he reportedly did not eat   all day.  There is no evidence of this is an acute CVA and the patient did not   have lateralizing signs or classic stroke symptoms.  We have to discuss with   the patient that we cannot rule out a TIA as source of his neurologic   concerns; however, we thought that this is unlikely  given the presentation.    Today, the patient is feeling well.  Upon examination, he is neurologically   intact without deficits.  He is feeling improved and ready for discharge home.    He will be discharged home with instructions for close follow up with   primary care and neurology when he returns home.  The patient should   immediately call 911 or present to the emergency room if he has any neurologic   deficits or complaints or if he has reoccurrence of his confusion.  He has   been instructed to not be left alone and he plans to stay with his wife.    Therefore, he is discharged in good and stable condition with close outpatient   follow up.    CONSULTANTS:  None.    STUDIES AND IMAGING TESTIN.  Chest x-ray negative.  2.  CT brain negative.  3.  MRI brain, no acute process, age-related cerebral atrophy, white matter   changes consistent with chronic microvascular ischemic gliosis.  4.  Carotid duplex, flow within normal limits, no significant obstructive   lesions noted in the extracranial carotid system.  5.  Echo, mild atrial enlargement, otherwise normal chamber sizes, normal LV   and RV systolic function.  LVEF 60% visually, normal diastolic dysfunction,   RVSP 50 mm.    LABORATORY EXAMINATION:  CBC is unremarkable.  CMP is unremarkable.    Glycohemoglobin is 5.7.  Lipid panel, total cholesterol 109, HDL is 32, LDL   54.  Cardiac marker, troponin is negative, INR is 2.20.  UA is negative.    PROCEDURES AND SURGERIES:  None.    DISPOSITION:  Home.    CONDITION:  Stable.    ACTIVITY:  As tolerated.    DIET:  Diabetic.    FOLLOWUP:  1.  Follow up with primary care provider in 1 week.  2.  Anticoagulation clinic as scheduled.  3.  Neurology followup outpatient as determined by primary care.    INSTRUCTIONS:  1.  Given instructions to return to the ER if he has any worsening symptoms,   worsening condition or failure to improve, stroke symptoms or reoccurrence of   confusion.  2.  Encouraged risk factor  reduction with tobacco and alcohol abstinence, diet   changes, weight loss and exercise.    DISCHARGE MEDICATIONS:  Continue these medications:  1.  Tylenol 500 mg every 6 hours as needed for moderate pain.  2.  Fish oil 1000 mg by mouth daily.  3.  Lisinopril 5 mg tablet daily.  4.  Lovastatin 80 mg tablet daily.  5.  Metformin 2000 mg by mouth every evening.  6.  Warfarin 7.5-10 mg by mouth daily as directed by [facility]in Clinic.       ____________________________________     DANIA Gary / BERNARD    DD:  01/21/2017 16:46:18  DT:  01/21/2017 21:48:08    D#:  [id number]  Job#:  [id number]

## 2017-01-22 NOTE — PROGRESS NOTES
Pt discharged with all belongings. Dc instructions, follow up appointments provided. Pt transport by taxi. All lines and monitors dc'd.

## 2017-01-22 NOTE — DISCHARGE INSTRUCTIONS
Discharge Instructions    Discharged to home by taxi with escort. Discharged via wheelchair, hospital escort: Yes.  Special equipment needed: Not Applicable    Be sure to schedule a follow-up appointment with your primary care doctor or any specialists as instructed.     Discharge Plan:   Diet Plan: Discussed  Activity Level: Discussed  Confirmed Follow up Appointment: Patient to Call and Schedule Appointment  Confirmed Symptoms Management: Discussed  Influenza Vaccine Indication: Patient Refuses (already up to date)    I understand that a diet low in cholesterol, fat, and sodium is recommended for good health. Unless I have been given specific instructions below for another diet, I accept this instruction as my diet prescription.   Other diet: Diabetic    Special Instructions:     · Is patient discharged on Warfarin / Coumadin?   No     · Is patient Post Blood Transfusion?  No    Depression / Suicide Risk    As you are discharged from this [facility] facility, it is important to learn how to keep safe from harming yourself.    Recognize the warning signs:  · Abrupt changes in personality, positive or negative- including increase in energy   · Giving away possessions  · Change in eating patterns- significant weight changes-  positive or negative  · Change in sleeping patterns- unable to sleep or sleeping all the time   · Unwillingness or inability to communicate  · Depression  · Unusual sadness, discouragement and loneliness  · Talk of wanting to die  · Neglect of personal appearance   · Rebelliousness- reckless behavior  · Withdrawal from people/activities they love  · Confusion- inability to concentrate     If you or a loved one observes any of these behaviors or has concerns about self-harm, here's what you can do:  · Talk about it- your feelings and reasons for harming yourself  · Remove any means that you might use to hurt yourself (examples: pills, rope, extension cords, firearm)  · Get professional help  from the community (Mental Health, Substance Abuse, psychological counseling)  · Do not be alone:Call your Safe Contact- someone whom you trust who will be there for you.  · Call your local CRISIS HOTLINE 420-9172 or 873-359-4348  · Call your local Children's Mobile Crisis Response Team [place] (064) 034-9439 or www.[employer]uujit  · Call the toll free National Suicide Prevention Hotlines   · National Suicide Prevention Lifeline 093-656-BOKJ (4781)  · National Hope Line Network 800-SUICIDE (962-6432)

## 2017-01-25 LAB — EKG IMPRESSION: NORMAL
